# Patient Record
Sex: MALE | URBAN - METROPOLITAN AREA
[De-identification: names, ages, dates, MRNs, and addresses within clinical notes are randomized per-mention and may not be internally consistent; named-entity substitution may affect disease eponyms.]

---

## 2021-12-07 ENCOUNTER — TELEPHONE ENCOUNTER (OUTPATIENT)
Dept: URBAN - METROPOLITAN AREA CLINIC 113 | Facility: CLINIC | Age: 51
End: 2021-12-07

## 2021-12-07 ENCOUNTER — LAB OUTSIDE AN ENCOUNTER (OUTPATIENT)
Dept: URBAN - METROPOLITAN AREA CLINIC 113 | Facility: CLINIC | Age: 51
End: 2021-12-07

## 2021-12-07 VITALS — HEIGHT: 71 IN | WEIGHT: 285 LBS | BODY MASS INDEX: 39.9 KG/M2

## 2021-12-07 RX ORDER — SODIUM, POTASSIUM,MAG SULFATES 17.5-3.13G
354ML SOLUTION, RECONSTITUTED, ORAL ORAL
Qty: 354 MILLILITER | Refills: 0 | OUTPATIENT
Start: 2021-12-07 | End: 2021-12-08

## 2021-12-07 RX ORDER — AMLODIPINE BESYLATE 10 MG/1
1 TABLET TABLET ORAL ONCE A DAY
Status: ACTIVE | COMMUNITY

## 2021-12-07 RX ORDER — PRAVASTATIN SODIUM 40 MG/1
1 TABLET TABLET ORAL ONCE A DAY
Status: ACTIVE | COMMUNITY

## 2021-12-07 RX ORDER — LISINOPRIL 20 MG/1
1 TABLET TABLET ORAL ONCE A DAY
Status: ACTIVE | COMMUNITY

## 2021-12-07 RX ORDER — HYDROCHLOROTHIAZIDE 25 MG/1
1 TABLET IN THE MORNING TABLET ORAL ONCE A DAY
Status: ACTIVE | COMMUNITY

## 2021-12-07 RX ORDER — POTASSIUM CHLORIDE 750 MG/1
1 TABLET WITH FOOD TABLET, FILM COATED, EXTENDED RELEASE ORAL TWICE A DAY
Status: ACTIVE | COMMUNITY

## 2021-12-14 ENCOUNTER — TELEPHONE ENCOUNTER (OUTPATIENT)
Dept: URBAN - METROPOLITAN AREA CLINIC 23 | Facility: CLINIC | Age: 51
End: 2021-12-14

## 2021-12-14 RX ORDER — HYDROCHLOROTHIAZIDE 25 MG/1
1 TABLET IN THE MORNING TABLET ORAL ONCE A DAY
Status: ACTIVE | COMMUNITY

## 2021-12-14 RX ORDER — POTASSIUM CHLORIDE 750 MG/1
1 TABLET WITH FOOD TABLET, FILM COATED, EXTENDED RELEASE ORAL TWICE A DAY
Status: ACTIVE | COMMUNITY

## 2021-12-14 RX ORDER — PRAVASTATIN SODIUM 40 MG/1
1 TABLET TABLET ORAL ONCE A DAY
Status: ACTIVE | COMMUNITY

## 2021-12-14 RX ORDER — LISINOPRIL 20 MG/1
1 TABLET TABLET ORAL ONCE A DAY
Status: ACTIVE | COMMUNITY

## 2021-12-14 RX ORDER — AMLODIPINE BESYLATE 10 MG/1
1 TABLET TABLET ORAL ONCE A DAY
Status: ACTIVE | COMMUNITY

## 2021-12-14 RX ORDER — POLYETHYLENE GLYCOL 3350 17 G/17G
1 PACKET MIXED WITH 8 OUNCES OF FLUID POWDER, FOR SOLUTION ORAL ONCE A DAY
Qty: 30 | OUTPATIENT
Start: 2021-12-16 | End: 2022-01-15

## 2021-12-27 ENCOUNTER — TELEPHONE ENCOUNTER (OUTPATIENT)
Dept: URBAN - METROPOLITAN AREA CLINIC 113 | Facility: CLINIC | Age: 51
End: 2021-12-27

## 2021-12-28 ENCOUNTER — OFFICE VISIT (OUTPATIENT)
Dept: URBAN - METROPOLITAN AREA MEDICAL CENTER 43 | Facility: MEDICAL CENTER | Age: 51
End: 2021-12-28
Payer: COMMERCIAL

## 2021-12-28 DIAGNOSIS — K64.1 GRADE II HEMORRHOIDS: ICD-10-CM

## 2021-12-28 DIAGNOSIS — Z12.11 COLON CANCER SCREENING (HIGH RISK): ICD-10-CM

## 2021-12-28 DIAGNOSIS — K63.5 BENIGN COLON POLYP: ICD-10-CM

## 2021-12-28 PROCEDURE — 45385 COLONOSCOPY W/LESION REMOVAL: CPT | Performed by: INTERNAL MEDICINE

## 2021-12-28 RX ORDER — PRAVASTATIN SODIUM 40 MG/1
1 TABLET TABLET ORAL ONCE A DAY
Status: ACTIVE | COMMUNITY

## 2021-12-28 RX ORDER — POTASSIUM CHLORIDE 750 MG/1
1 TABLET WITH FOOD TABLET, FILM COATED, EXTENDED RELEASE ORAL TWICE A DAY
Status: ACTIVE | COMMUNITY

## 2021-12-28 RX ORDER — LISINOPRIL 20 MG/1
1 TABLET TABLET ORAL ONCE A DAY
Status: ACTIVE | COMMUNITY

## 2021-12-28 RX ORDER — AMLODIPINE BESYLATE 10 MG/1
1 TABLET TABLET ORAL ONCE A DAY
Status: ACTIVE | COMMUNITY

## 2021-12-28 RX ORDER — HYDROCHLOROTHIAZIDE 25 MG/1
1 TABLET IN THE MORNING TABLET ORAL ONCE A DAY
Status: ACTIVE | COMMUNITY

## 2021-12-28 RX ORDER — POLYETHYLENE GLYCOL 3350 17 G/17G
1 PACKET MIXED WITH 8 OUNCES OF FLUID POWDER, FOR SOLUTION ORAL ONCE A DAY
Qty: 30 | Status: ACTIVE | COMMUNITY
Start: 2021-12-16 | End: 2022-01-15

## 2022-01-11 ENCOUNTER — OFFICE VISIT (OUTPATIENT)
Dept: URBAN - METROPOLITAN AREA CLINIC 113 | Facility: CLINIC | Age: 52
End: 2022-01-11
Payer: COMMERCIAL

## 2022-01-11 ENCOUNTER — WEB ENCOUNTER (OUTPATIENT)
Dept: URBAN - METROPOLITAN AREA CLINIC 113 | Facility: CLINIC | Age: 52
End: 2022-01-11

## 2022-01-11 VITALS
DIASTOLIC BLOOD PRESSURE: 84 MMHG | WEIGHT: 293 LBS | TEMPERATURE: 98.7 F | HEIGHT: 71 IN | HEART RATE: 67 BPM | BODY MASS INDEX: 41.02 KG/M2 | SYSTOLIC BLOOD PRESSURE: 129 MMHG

## 2022-01-11 DIAGNOSIS — K63.5 HYPERPLASTIC POLYP OF DESCENDING COLON: ICD-10-CM

## 2022-01-11 DIAGNOSIS — Z80.0 FAMILY HISTORY OF CHOLANGIOCARCINOMA: ICD-10-CM

## 2022-01-11 DIAGNOSIS — K64.8 INTERNAL HEMORRHOIDS: ICD-10-CM

## 2022-01-11 PROCEDURE — 99213 OFFICE O/P EST LOW 20 MIN: CPT

## 2022-01-11 RX ORDER — PRAVASTATIN SODIUM 40 MG/1
1 TABLET TABLET ORAL ONCE A DAY
Status: ACTIVE | COMMUNITY

## 2022-01-11 RX ORDER — POTASSIUM CHLORIDE 750 MG/1
1 TABLET WITH FOOD TABLET, FILM COATED, EXTENDED RELEASE ORAL TWICE A DAY
Status: ACTIVE | COMMUNITY

## 2022-01-11 RX ORDER — LISINOPRIL 20 MG/1
1 TABLET TABLET ORAL ONCE A DAY
Status: ACTIVE | COMMUNITY

## 2022-01-11 RX ORDER — HYDROCHLOROTHIAZIDE 25 MG/1
1 TABLET IN THE MORNING TABLET ORAL ONCE A DAY
Status: ACTIVE | COMMUNITY

## 2022-01-11 RX ORDER — AMLODIPINE BESYLATE 10 MG/1
1 TABLET TABLET ORAL ONCE A DAY
Status: ACTIVE | COMMUNITY

## 2022-01-11 NOTE — HPI-TODAY'S VISIT:
51-year-old male with history of hyperlipidemia, sleep apnea, hypertension and family history of liver disease presents for follow-up of screening colonoscopy. Colonoscopy on 12/28/2021: Quality of bowel prep was good.  Nonbleeding grade II internal hemorrhoids.  8 mm polyp found in the descending colon, the polyp was sessile.  Polypectomy revealed hyperplastic polyp from the descending colon.  A repeat colonoscopy was recommended in 5-10 years time for surveillance.   He denies heartburn or dysphagia. Denies abdominal pain, nausea or vomiting. He has regular bowel movements 1-2 times daily without blood per rectum or melena. He denies a family history of colon cancer, colon polyps, IBD or PUD. His mother did have a "rare form" of cholangiocarcinoma that metastasized to her liver. She passed at the age of 53. He has never had an EGD. He is otherwise doing well from a GI standpoint.

## 2023-06-22 ENCOUNTER — OFFICE VISIT (OUTPATIENT)
Dept: URBAN - METROPOLITAN AREA CLINIC 107 | Facility: CLINIC | Age: 53
End: 2023-06-22

## 2023-09-07 ENCOUNTER — LAB OUTSIDE AN ENCOUNTER (OUTPATIENT)
Dept: URBAN - METROPOLITAN AREA CLINIC 107 | Facility: CLINIC | Age: 53
End: 2023-09-07

## 2023-09-07 ENCOUNTER — OFFICE VISIT (OUTPATIENT)
Dept: URBAN - METROPOLITAN AREA CLINIC 107 | Facility: CLINIC | Age: 53
End: 2023-09-07
Payer: COMMERCIAL

## 2023-09-07 ENCOUNTER — DASHBOARD ENCOUNTERS (OUTPATIENT)
Age: 53
End: 2023-09-07

## 2023-09-07 VITALS
DIASTOLIC BLOOD PRESSURE: 80 MMHG | TEMPERATURE: 97.1 F | HEART RATE: 72 BPM | BODY MASS INDEX: 40.18 KG/M2 | HEIGHT: 71 IN | RESPIRATION RATE: 18 BRPM | SYSTOLIC BLOOD PRESSURE: 135 MMHG | WEIGHT: 287 LBS

## 2023-09-07 DIAGNOSIS — K64.8 INTERNAL HEMORRHOIDS: ICD-10-CM

## 2023-09-07 DIAGNOSIS — K63.5 HYPERPLASTIC POLYP OF DESCENDING COLON: ICD-10-CM

## 2023-09-07 DIAGNOSIS — R74.8 ELEVATED LIVER ENZYMES: ICD-10-CM

## 2023-09-07 DIAGNOSIS — K76.0 FATTY LIVER: ICD-10-CM

## 2023-09-07 PROBLEM — 197321007: Status: ACTIVE | Noted: 2023-09-07

## 2023-09-07 PROCEDURE — 99244 OFF/OP CNSLTJ NEW/EST MOD 40: CPT | Performed by: INTERNAL MEDICINE

## 2023-09-07 PROCEDURE — 99214 OFFICE O/P EST MOD 30 MIN: CPT | Performed by: INTERNAL MEDICINE

## 2023-09-07 RX ORDER — LISINOPRIL 20 MG/1
1 TABLET TABLET ORAL ONCE A DAY
Status: ACTIVE | COMMUNITY

## 2023-09-07 RX ORDER — HYDROCHLOROTHIAZIDE 25 MG/1
1 TABLET IN THE MORNING TABLET ORAL ONCE A DAY
Status: ACTIVE | COMMUNITY

## 2023-09-07 RX ORDER — AMLODIPINE BESYLATE 10 MG/1
1 TABLET TABLET ORAL ONCE A DAY
Status: ACTIVE | COMMUNITY

## 2023-09-07 RX ORDER — PRAVASTATIN SODIUM 40 MG/1
1 TABLET TABLET ORAL ONCE A DAY
Status: ACTIVE | COMMUNITY

## 2023-09-07 RX ORDER — POTASSIUM CHLORIDE 750 MG/1
1 TABLET WITH FOOD TABLET, FILM COATED, EXTENDED RELEASE ORAL TWICE A DAY
Status: ACTIVE | COMMUNITY

## 2023-09-07 NOTE — HPI-TODAY'S VISIT:
53 y/o male presenting for f/u. He was last seen in January 2022. He has a hx of NAFLD. Recent labs show a PLT of 186 and HGB of 14. His liver enzymes are normal. CR is increased at 1.48. He does have a small umbilical hernia on CT. He is a pre-diabetic and has ELIAS. He wears a CPAP. He occasionally drinks ETOH.   1/11/22 51-year-old male with history of hyperlipidemia, sleep apnea, hypertension and family history of liver disease presents for follow-up of screening colonoscopy. Colonoscopy on 12/28/2021: Quality of bowel prep was good.  Nonbleeding grade II internal hemorrhoids.  8 mm polyp found in the descending colon, the polyp was sessile.  Polypectomy revealed hyperplastic polyp from the descending colon.  A repeat colonoscopy was recommended in 5-10 years time for surveillance.   He denies heartburn or dysphagia. Denies abdominal pain, nausea or vomiting. He has regular bowel movements 1-2 times daily without blood per rectum or melena. He denies a family history of colon cancer, colon polyps, IBD or PUD. His mother did have a "rare form" of cholangiocarcinoma that metastasized to her liver. She passed at the age of 53. He has never had an EGD. He is otherwise doing well from a GI standpoint.